# Patient Record
Sex: FEMALE
[De-identification: names, ages, dates, MRNs, and addresses within clinical notes are randomized per-mention and may not be internally consistent; named-entity substitution may affect disease eponyms.]

---

## 2019-11-19 PROBLEM — Z00.00 ENCOUNTER FOR PREVENTIVE HEALTH EXAMINATION: Status: ACTIVE | Noted: 2019-11-19

## 2019-12-02 ENCOUNTER — APPOINTMENT (OUTPATIENT)
Dept: ORTHOPEDIC SURGERY | Facility: CLINIC | Age: 24
End: 2019-12-02
Payer: COMMERCIAL

## 2019-12-02 VITALS
SYSTOLIC BLOOD PRESSURE: 110 MMHG | BODY MASS INDEX: 22.29 KG/M2 | WEIGHT: 142 LBS | TEMPERATURE: 98 F | HEIGHT: 67 IN | DIASTOLIC BLOOD PRESSURE: 64 MMHG | HEART RATE: 80 BPM

## 2019-12-02 DIAGNOSIS — Z82.62 FAMILY HISTORY OF OSTEOPOROSIS: ICD-10-CM

## 2019-12-02 DIAGNOSIS — M46.1 SACROILIITIS, NOT ELSEWHERE CLASSIFIED: ICD-10-CM

## 2019-12-02 DIAGNOSIS — Z78.9 OTHER SPECIFIED HEALTH STATUS: ICD-10-CM

## 2019-12-02 PROCEDURE — 73502 X-RAY EXAM HIP UNI 2-3 VIEWS: CPT | Mod: LT

## 2019-12-02 PROCEDURE — 99203 OFFICE O/P NEW LOW 30 MIN: CPT

## 2019-12-05 NOTE — PHYSICAL EXAM
[de-identified] : Right Hip: Range of Motion in Degrees:\par 	                                 Claimant:	   Normal:	\par Flexion (Active) 	                 120 	   120-degrees	\par Flexion (Passive)	                 120	   120-degrees	\par Extension (Active)	                 -30	   -30-degrees	\par Extension (Passive)	 -30	   -30-degrees	\par Abduction (Active)	                 45-50	   01-86-eamjcrz	\par Abduction (Passive)	 45-50	   96-73-kxdenhi	\par Adduction (Active)  	 20-30	   29-74-bzgcffv	\par Adduction (Passive)	 20-30	   00-22-yienxwi	\par Internal Rotation (Active) 	 35	   35-degrees	\par Internal Rotation (Passive)	 35	   35-degrees	\par External Rotation (Active)	 45	   45-degrees	\par External Rotation (Passive)	 45	   45-degrees	\par \par No tenderness with internal or external rotation or axial load.  No tenderness to palpation over the greater trochanter.  Negative Trendelenburg.  No tenderness with resisted abduction.  No weakness to flexion, extension, abduction or adduction.  No evidence of instability.  No motor or sensory deficits.  2+ DP and PT pulses.  Skin is intact.  No scars, rashes or lesions.  \par \par Left Hip: Range of Motion in Degrees:\par 	                                 Claimant:	   Normal:	\par Flexion (Active) 	                 120 	   120-degrees	\par Flexion (Passive)	                 120	   120-degrees	\par Extension (Active)	                 -30	   -30-degrees	\par Extension (Passive)	 -30	   -30-degrees	\par Abduction (Active)	                 45-50	   00-33-euobzwt	\par Abduction (Passive)	 45-50	   47-18-qbphaew	\par Adduction (Active)  	 20-30	   81-38-udoxsrt	\par Adduction (Passive)	 20-30	   95-23-ljznesy	\par Internal Rotation (Active) 	 35	   35-degrees	\par Internal Rotation (Passive)	 35	   35-degrees	\par External Rotation (Active)	 45	   45-degrees	\par External Rotation (Passive)	 45	   45-degrees	\par \par Tenderness over the SI joint with positive Vickey test. Negative Trendelenburg.  No weakness to flexion, extension, abduction or adduction.  No evidence of instability.  No motor or sensory deficits.  2+ DP and PT pulses.  Skin is intact.  No scars, rashes or lesions.  \par  \par   [de-identified] : Gait and Station:  Ambulating with a slightly antalgic to antalgic gait.  Normal Station.  [de-identified] : Appearance:  Well developed, well-nourished female in no acute distress.\par   [de-identified] : Radiographs, two to three views of the left hip and pelvis, show no obvious osseous abnormalities.\par

## 2019-12-05 NOTE — DISCUSSION/SUMMARY
[de-identified] : At this time, due to left sacroiliitis, I recommended that she be referred for an intra-articular injection by Interventional Radiology.\par

## 2019-12-05 NOTE — HISTORY OF PRESENT ILLNESS
[7] : the ailment interference is 7/10 [6] : the ailment interference is 6/10 [9] : the ailment interference is 9/10 [8] : the ailment interference is 8/10 [10  (interferes completely)] : the ailment interference is10/10 (interferes completely) [de-identified] : Running, spin class, walking and quickly shifting weight [de-identified] : The patient comes in today for her left hip.  She states she has been training for a marathon, did some 20 mile runs, and noticed some pain.  She points to the lower left side of her back as the source of her pain.  The patient states the onset/injury occurred on 11/05/19. This injury is not work related or due to an automobile accident.  The patient states the pain is shooting with shifting weight and dull in bed.  The patient describes the pain as constant and localized. [de-identified] : Not moving [] : No

## 2019-12-05 NOTE — ADDENDUM
[FreeTextEntry1] : This note was written by Usama Davis on 12/03/2019, acting as a scribe for Keith Harding III, MD

## 2021-05-10 ENCOUNTER — APPOINTMENT (OUTPATIENT)
Dept: DISASTER EMERGENCY | Facility: OTHER | Age: 26
End: 2021-05-10